# Patient Record
Sex: FEMALE | Race: WHITE | URBAN - METROPOLITAN AREA
[De-identification: names, ages, dates, MRNs, and addresses within clinical notes are randomized per-mention and may not be internally consistent; named-entity substitution may affect disease eponyms.]

---

## 2022-05-23 ENCOUNTER — APPOINTMENT (RX ONLY)
Dept: URBAN - METROPOLITAN AREA CLINIC 49 | Facility: CLINIC | Age: 77
Setting detail: DERMATOLOGY
End: 2022-05-23

## 2022-05-23 PROBLEM — D48.5 NEOPLASM OF UNCERTAIN BEHAVIOR OF SKIN: Status: ACTIVE | Noted: 2022-05-23

## 2022-05-23 PROCEDURE — ? RECOMMENDATIONS

## 2022-05-23 PROCEDURE — ? PHOTO-DOCUMENTATION

## 2022-05-23 PROCEDURE — ? COUNSELING

## 2022-05-23 PROCEDURE — ? DEFER

## 2022-05-23 PROCEDURE — ? ADDITIONAL NOTES

## 2022-05-23 PROCEDURE — 99203 OFFICE O/P NEW LOW 30 MIN: CPT

## 2022-05-23 NOTE — PROCEDURE: MIPS QUALITY
Quality 402: Tobacco Use And Help With Quitting Among Adolescents: Patient screened for tobacco and never smoked
Quality 110: Preventive Care And Screening: Influenza Immunization: Influenza Immunization Administered during Influenza season
Quality 226: Preventive Care And Screening: Tobacco Use: Screening And Cessation Intervention: Patient screened for tobacco use and is an ex/non-smoker
Detail Level: Detailed
Quality 130: Documentation Of Current Medications In The Medical Record: Current Medications Documented
Quality 111:Pneumonia Vaccination Status For Older Adults: Pneumococcal Vaccination Previously Received
Quality 431: Preventive Care And Screening: Unhealthy Alcohol Use - Screening: Patient screened for unhealthy alcohol use using a single question and scores less than 2 times per year
Quality 138: Melanoma: Coordination Of Care: A treatment plan was communicated to the physicians providing continuing care within one month of diagnosis outlining: diagnosis, tumor thickness and a plan for surgery or alternate care.
Quality 137: Melanoma: Continuity Of Care - Recall System: Patient information entered into a recall system that includes: target date for the next exam specified AND a process to follow up with patients regarding missed or unscheduled appointments

## 2022-05-23 NOTE — PROCEDURE: RECOMMENDATIONS
Recommendation Preamble: The following recommendations were made during the visit:
Detail Level: Zone
Recommendations (Free Text): Should this be a malignant melanoma, (pending path report and records), discussed need for Q3 month FBSE vs BCC/SCC Q6 month check. Pt is currently self pay and will schedule for FBSE when she can. She is working on getting insurance coverage. Recommend sooner rather than later. Patient verbalized understanding.
Render Risk Assessment In Note?: no

## 2022-05-23 NOTE — PROCEDURE: DEFER
Introduction Text (Please End With A Colon): The following procedure was deferred: Biopsy
Instructions (Optional): As no pathology report with official diagnosis of type of skin cancer and obvious recurrence of lesion since initial biopsy, recommended rebiopsy of site today. Pt has no insurance, is self-pay. Pt and  elect to obtain records from previous dermatologist and have path report and records faxed to our office. If unable to obtain records, recommend re-biopsy of site.
Detail Level: Detailed

## 2022-05-23 NOTE — HPI: SKIN LESION
How Severe Is Your Skin Lesion?: mild
Has Your Skin Lesion Been Treated?: been treated
Is This A New Presentation, Or A Follow-Up?: Skin Lesion
When Was It Treated?: 01/01/2022
Additional History: Patient had a bx of the lesion on her left side of upper lip, which she was diagnosed with malignant melanoma of skin. They do not have the pathology report from the [facility]. Patient reports that she had had the lesion for 60 years, but it began to bleed later on.

## 2022-05-23 NOTE — PROCEDURE: ADDITIONAL NOTES
Detail Level: Simple
Additional Notes: Pt was referred to our office by Dr. Susu Rich at  primary care under diagnosis of ‘malignant melanoma’. Patient brought in a certified letter from previous maxilofacial surgery office in prior hometown in . Letter (scanned in pt attachments) states recommendation for excision of ‘mild form of skin cancer’. Patient and  have since moved and patient has been referred to is for care and evaluation of lesion. Patient unsure if diagnosis was actually melanoma vs other skin cancer. No pathology report available today. Lesion appears to have reappeared following initial biopsy a few months ago. Discussed case with Dr. Ramos. Recommended rebiopsy of site vs obtaining pathology report asap to further manage and treat unspecified skin cancer. Signed records release today. Pt will call office in  to obtain pathology report and have sent to our office as soon as possible. Stressed urgency of situation if this is indeed an untreated melanoma. Patient and  verbalized understanding and are agreeable to plan.
Render Risk Assessment In Note?: no